# Patient Record
Sex: FEMALE
[De-identification: names, ages, dates, MRNs, and addresses within clinical notes are randomized per-mention and may not be internally consistent; named-entity substitution may affect disease eponyms.]

---

## 2024-04-08 ENCOUNTER — APPOINTMENT (OUTPATIENT)
Dept: ENDOCRINOLOGY | Facility: CLINIC | Age: 49
End: 2024-04-08
Payer: MEDICAID

## 2024-04-08 VITALS — BODY MASS INDEX: 43.05 KG/M2 | WEIGHT: 243 LBS | HEIGHT: 63 IN

## 2024-04-08 DIAGNOSIS — E66.9 OBESITY, UNSPECIFIED: ICD-10-CM

## 2024-04-08 PROBLEM — Z00.00 ENCOUNTER FOR PREVENTIVE HEALTH EXAMINATION: Status: ACTIVE | Noted: 2024-04-08

## 2024-04-08 PROCEDURE — 99205 OFFICE O/P NEW HI 60 MIN: CPT

## 2024-04-08 RX ORDER — TIRZEPATIDE 2.5 MG/.5ML
2.5 INJECTION, SOLUTION SUBCUTANEOUS
Qty: 8 | Refills: 0 | Status: ACTIVE | COMMUNITY
Start: 2024-04-08 | End: 1900-01-01

## 2024-04-08 NOTE — ASSESSMENT
[FreeTextEntry1] : 1) Obesity, Morbid: Class III, complicated by severe DJD. High risk of metabolic syndrome and future complications. Discussed options including meds, bariatric surgery and lifestyle modification. RB and alternatives discussed. Questions answered and she verbalized understanding. Refer to nutrition and start hypocaloric, hypocarb diet in addition to exercise regimen. Refer to  now. proceed w/ zepbound initiatino.

## 2024-04-08 NOTE — HISTORY OF PRESENT ILLNESS
[FreeTextEntry1] : 47 y/o F w/ Hx of obesity and DJD here for initial evaluation and management of weight issuesgenerally feels well and endorses no acute complaints. reports lifelong issues w/ weight and hx of childhood obesity. max weight was 330 prior to RnY surgery for weight in 2010, no complications reported. reports she eats 2 meals a day, usually skips brk, largest meal is dinner ~7 PM. eats more during the weekend. usually cooks for herself during the week. preferred starches are bread and rice. deniesto soda consumption. denies steroid/AA use in the past. no past weight loss interventions. physically active. does not carb/calorie count. She otherwise denies any f/c, CP, SOB, palpitations, tremors, depressed mood, anxiety, palpitations, n/v, stool/urinary abn, skin/weight changes, heat/cold intolerance, HAs, breast/nipple changes, polyuria/polydipsia/nocturia or other complaints.

## 2024-07-29 ENCOUNTER — APPOINTMENT (OUTPATIENT)
Dept: ENDOCRINOLOGY | Facility: CLINIC | Age: 49
End: 2024-07-29